# Patient Record
Sex: MALE | Race: WHITE | NOT HISPANIC OR LATINO | URBAN - METROPOLITAN AREA
[De-identification: names, ages, dates, MRNs, and addresses within clinical notes are randomized per-mention and may not be internally consistent; named-entity substitution may affect disease eponyms.]

---

## 2019-12-27 PROBLEM — Z00.00 ENCOUNTER FOR PREVENTIVE HEALTH EXAMINATION: Status: ACTIVE | Noted: 2019-12-27

## 2020-02-06 ENCOUNTER — EMERGENCY (EMERGENCY)
Facility: HOSPITAL | Age: 60
LOS: 0 days | Discharge: HOME | End: 2020-02-07
Attending: EMERGENCY MEDICINE | Admitting: EMERGENCY MEDICINE
Payer: COMMERCIAL

## 2020-02-06 VITALS
SYSTOLIC BLOOD PRESSURE: 152 MMHG | OXYGEN SATURATION: 99 % | WEIGHT: 210.1 LBS | TEMPERATURE: 98 F | DIASTOLIC BLOOD PRESSURE: 86 MMHG | HEIGHT: 70 IN | HEART RATE: 110 BPM | RESPIRATION RATE: 18 BRPM

## 2020-02-06 DIAGNOSIS — R07.9 CHEST PAIN, UNSPECIFIED: ICD-10-CM

## 2020-02-06 DIAGNOSIS — H53.149 VISUAL DISCOMFORT, UNSPECIFIED: ICD-10-CM

## 2020-02-06 DIAGNOSIS — R11.0 NAUSEA: ICD-10-CM

## 2020-02-06 DIAGNOSIS — D68.0 VON WILLEBRAND DISEASE: ICD-10-CM

## 2020-02-06 DIAGNOSIS — K21.9 GASTRO-ESOPHAGEAL REFLUX DISEASE WITHOUT ESOPHAGITIS: ICD-10-CM

## 2020-02-06 DIAGNOSIS — R42 DIZZINESS AND GIDDINESS: ICD-10-CM

## 2020-02-06 DIAGNOSIS — E11.9 TYPE 2 DIABETES MELLITUS WITHOUT COMPLICATIONS: ICD-10-CM

## 2020-02-06 LAB
ALBUMIN SERPL ELPH-MCNC: 4.4 G/DL — SIGNIFICANT CHANGE UP (ref 3.5–5.2)
ALP SERPL-CCNC: 70 U/L — SIGNIFICANT CHANGE UP (ref 30–115)
ALT FLD-CCNC: 27 U/L — SIGNIFICANT CHANGE UP (ref 0–41)
ANION GAP SERPL CALC-SCNC: 15 MMOL/L — HIGH (ref 7–14)
AST SERPL-CCNC: 17 U/L — SIGNIFICANT CHANGE UP (ref 0–41)
BASOPHILS # BLD AUTO: 0.04 K/UL — SIGNIFICANT CHANGE UP (ref 0–0.2)
BASOPHILS NFR BLD AUTO: 0.4 % — SIGNIFICANT CHANGE UP (ref 0–1)
BILIRUB SERPL-MCNC: 0.3 MG/DL — SIGNIFICANT CHANGE UP (ref 0.2–1.2)
BUN SERPL-MCNC: 21 MG/DL — HIGH (ref 10–20)
CALCIUM SERPL-MCNC: 9.4 MG/DL — SIGNIFICANT CHANGE UP (ref 8.5–10.1)
CHLORIDE SERPL-SCNC: 105 MMOL/L — SIGNIFICANT CHANGE UP (ref 98–110)
CHOLEST SERPL-MCNC: 194 MG/DL — SIGNIFICANT CHANGE UP (ref 100–200)
CO2 SERPL-SCNC: 23 MMOL/L — SIGNIFICANT CHANGE UP (ref 17–32)
CREAT SERPL-MCNC: 0.9 MG/DL — SIGNIFICANT CHANGE UP (ref 0.7–1.5)
D DIMER BLD IA.RAPID-MCNC: 54 NG/ML DDU — SIGNIFICANT CHANGE UP (ref 0–230)
EOSINOPHIL # BLD AUTO: 0.08 K/UL — SIGNIFICANT CHANGE UP (ref 0–0.7)
EOSINOPHIL NFR BLD AUTO: 0.8 % — SIGNIFICANT CHANGE UP (ref 0–8)
GLUCOSE SERPL-MCNC: 159 MG/DL — HIGH (ref 70–99)
HCT VFR BLD CALC: 41.9 % — LOW (ref 42–52)
HDLC SERPL-MCNC: 62 MG/DL — SIGNIFICANT CHANGE UP
HGB BLD-MCNC: 14.5 G/DL — SIGNIFICANT CHANGE UP (ref 14–18)
IMM GRANULOCYTES NFR BLD AUTO: 0.6 % — HIGH (ref 0.1–0.3)
LIPID PNL WITH DIRECT LDL SERPL: 119 MG/DL — SIGNIFICANT CHANGE UP (ref 4–129)
LYMPHOCYTES # BLD AUTO: 1.95 K/UL — SIGNIFICANT CHANGE UP (ref 1.2–3.4)
LYMPHOCYTES # BLD AUTO: 18.4 % — LOW (ref 20.5–51.1)
MAGNESIUM SERPL-MCNC: 2 MG/DL — SIGNIFICANT CHANGE UP (ref 1.8–2.4)
MCHC RBC-ENTMCNC: 29.7 PG — SIGNIFICANT CHANGE UP (ref 27–31)
MCHC RBC-ENTMCNC: 34.6 G/DL — SIGNIFICANT CHANGE UP (ref 32–37)
MCV RBC AUTO: 85.7 FL — SIGNIFICANT CHANGE UP (ref 80–94)
MONOCYTES # BLD AUTO: 0.8 K/UL — HIGH (ref 0.1–0.6)
MONOCYTES NFR BLD AUTO: 7.6 % — SIGNIFICANT CHANGE UP (ref 1.7–9.3)
NEUTROPHILS # BLD AUTO: 7.64 K/UL — HIGH (ref 1.4–6.5)
NEUTROPHILS NFR BLD AUTO: 72.2 % — SIGNIFICANT CHANGE UP (ref 42.2–75.2)
NRBC # BLD: 0 /100 WBCS — SIGNIFICANT CHANGE UP (ref 0–0)
NT-PROBNP SERPL-SCNC: 21 PG/ML — SIGNIFICANT CHANGE UP (ref 0–300)
PLATELET # BLD AUTO: 183 K/UL — SIGNIFICANT CHANGE UP (ref 130–400)
POTASSIUM SERPL-MCNC: 4 MMOL/L — SIGNIFICANT CHANGE UP (ref 3.5–5)
POTASSIUM SERPL-SCNC: 4 MMOL/L — SIGNIFICANT CHANGE UP (ref 3.5–5)
PROT SERPL-MCNC: 6.9 G/DL — SIGNIFICANT CHANGE UP (ref 6–8)
RBC # BLD: 4.89 M/UL — SIGNIFICANT CHANGE UP (ref 4.7–6.1)
RBC # FLD: 13.1 % — SIGNIFICANT CHANGE UP (ref 11.5–14.5)
SODIUM SERPL-SCNC: 143 MMOL/L — SIGNIFICANT CHANGE UP (ref 135–146)
TOTAL CHOLESTEROL/HDL RATIO MEASUREMENT: 3.1 RATIO — LOW (ref 4–5.5)
TRIGL SERPL-MCNC: 104 MG/DL — SIGNIFICANT CHANGE UP (ref 10–149)
TROPONIN T SERPL-MCNC: <0.01 NG/ML — SIGNIFICANT CHANGE UP
TROPONIN T SERPL-MCNC: <0.01 NG/ML — SIGNIFICANT CHANGE UP
WBC # BLD: 10.57 K/UL — SIGNIFICANT CHANGE UP (ref 4.8–10.8)
WBC # FLD AUTO: 10.57 K/UL — SIGNIFICANT CHANGE UP (ref 4.8–10.8)

## 2020-02-06 PROCEDURE — 70450 CT HEAD/BRAIN W/O DYE: CPT | Mod: 26

## 2020-02-06 PROCEDURE — 99253 IP/OBS CNSLTJ NEW/EST LOW 45: CPT

## 2020-02-06 PROCEDURE — 93016 CV STRESS TEST SUPVJ ONLY: CPT

## 2020-02-06 PROCEDURE — 99220: CPT

## 2020-02-06 PROCEDURE — 93010 ELECTROCARDIOGRAM REPORT: CPT | Mod: 76

## 2020-02-06 PROCEDURE — 71045 X-RAY EXAM CHEST 1 VIEW: CPT | Mod: 26

## 2020-02-06 PROCEDURE — 93018 CV STRESS TEST I&R ONLY: CPT

## 2020-02-06 PROCEDURE — 78452 HT MUSCLE IMAGE SPECT MULT: CPT | Mod: 26

## 2020-02-06 PROCEDURE — 70551 MRI BRAIN STEM W/O DYE: CPT | Mod: 26

## 2020-02-06 RX ORDER — ADENOSINE 3 MG/ML
60 INJECTION INTRAVENOUS ONCE
Refills: 0 | Status: COMPLETED | OUTPATIENT
Start: 2020-02-06 | End: 2020-02-06

## 2020-02-06 RX ORDER — MECLIZINE HCL 12.5 MG
25 TABLET ORAL ONCE
Refills: 0 | Status: COMPLETED | OUTPATIENT
Start: 2020-02-06 | End: 2020-02-06

## 2020-02-06 RX ORDER — ATORVASTATIN CALCIUM 80 MG/1
80 TABLET, FILM COATED ORAL AT BEDTIME
Refills: 0 | Status: DISCONTINUED | OUTPATIENT
Start: 2020-02-06 | End: 2020-02-07

## 2020-02-06 RX ORDER — SODIUM CHLORIDE 9 MG/ML
1000 INJECTION INTRAMUSCULAR; INTRAVENOUS; SUBCUTANEOUS ONCE
Refills: 0 | Status: COMPLETED | OUTPATIENT
Start: 2020-02-06 | End: 2020-02-06

## 2020-02-06 RX ORDER — ONDANSETRON 8 MG/1
4 TABLET, FILM COATED ORAL ONCE
Refills: 0 | Status: COMPLETED | OUTPATIENT
Start: 2020-02-06 | End: 2020-02-06

## 2020-02-06 RX ORDER — ACETAMINOPHEN 500 MG
650 TABLET ORAL ONCE
Refills: 0 | Status: COMPLETED | OUTPATIENT
Start: 2020-02-06 | End: 2020-02-06

## 2020-02-06 RX ADMIN — Medication 650 MILLIGRAM(S): at 03:12

## 2020-02-06 RX ADMIN — Medication 25 MILLIGRAM(S): at 04:34

## 2020-02-06 RX ADMIN — Medication 650 MILLIGRAM(S): at 07:09

## 2020-02-06 RX ADMIN — SODIUM CHLORIDE 1000 MILLILITER(S): 9 INJECTION INTRAMUSCULAR; INTRAVENOUS; SUBCUTANEOUS at 03:15

## 2020-02-06 RX ADMIN — ONDANSETRON 4 MILLIGRAM(S): 8 TABLET, FILM COATED ORAL at 03:12

## 2020-02-06 RX ADMIN — Medication 1 MILLIGRAM(S): at 17:45

## 2020-02-06 RX ADMIN — ADENOSINE 600 MILLIGRAM(S): 3 INJECTION INTRAVENOUS at 11:21

## 2020-02-06 RX ADMIN — SODIUM CHLORIDE 2000 MILLILITER(S): 9 INJECTION INTRAMUSCULAR; INTRAVENOUS; SUBCUTANEOUS at 03:12

## 2020-02-06 RX ADMIN — ATORVASTATIN CALCIUM 80 MILLIGRAM(S): 80 TABLET, FILM COATED ORAL at 21:15

## 2020-02-06 NOTE — ED PROVIDER NOTE - PROGRESS NOTE DETAILS
Spoke with Neuro NP. Stated neuro will see the patient. Neuro NP stated that symptoms are classic vertigo. Stated that neuro attending will also see patient for final recommendations. Signed out patient to observation unit PA.

## 2020-02-06 NOTE — ED PROVIDER NOTE - ATTENDING CONTRIBUTION TO CARE
60y m h/o vWF, abnormal clotting factor (unsure of name / not on asa), dm, hl, gerd p/w dizziness x 3d. Described as intermitt spinning sensation, no specific aggrav/allev factors, accomp by ha, photophobia & nausea. Similar sx in past never formally dx as vertigo. Called PMD Dr. Lu yesterday who gave Rx for meclizine, pt took few doses w/some relief but sx recurred this evening. Tonight also dvlpd L upper chest pain, palpitations & sob. Denies blurry vision, neck pain or stiffness, cough, hemoptysis, v/d, abd pain, focal numbness or weakness. No recent travel/sx/immob/hormone use. No FHx HD. Normal nuc stress in Apr 2012, seen by Cardio Dr. Singer @ time.     PE:  nad  skin warm, dry  ncat  neck supple  rrr nl s1s2 no mrg  ctab no wrr  abd soft ntnd no palpable masses no rgr  back non-tender no cvat  ext no cce dpi  neuro aaox3, fatiguable bl horizontal nystagmus, otherwise cn 2-12 intact bl no nystagmus or facial droop, 5/5 strength x 4 no drift, gross sensation intact, finger-nose nl, gait nl, romberg neg

## 2020-02-06 NOTE — ED CDU PROVIDER INITIAL DAY NOTE - OBJECTIVE STATEMENT
The patient is a 60 year old male with a history of von Willebrand disease, DM, GERD comes to the ed c/o for dizziness and chest pain. Symptoms initially began with nausea x3 days ago and then the pt woke up with severe dizziness and felt like the room Is spinning. pt also c/o photophobia. pt went to see his PMD who gave him meclizine which helped at the time but the patient began having dizziness symptoms again with new onset shortness of breath and chest pain that is pressure. pt state his CP is located in the middle of the chest and associated with left arm "pulsating." No headaches, urinary sx, abdominal pain, fever, chills, vomiting, weakness, leg swelling.

## 2020-02-06 NOTE — CONSULT NOTE ADULT - SUBJECTIVE AND OBJECTIVE BOX
Neurology Consult    Patient is a 60y old  Male who presents with a chief complaint of vertigo with chest pain and palpatations    HPI:· The patient is a 60 year old male with a history of von Willebrand disease, DM, GERD presenting for dizziness and chest pain. Symptoms began three days ago with nausea. The following day patient woke up with severe dizziness and room-spinning sensation. Associated with photophobia. Called his PMD who told him to come to the office but patient did not feel well enough so PMD prescribed meclizine. Patient states meclizine improved symptoms. This evening, patient began having dizziness symptoms again with new onset shortness of breath and chest pain that is pressure like, located in the middle of the chest associated with left arm "pulsating." No fevers, no recent travel, no hormone use, no hemoptysis, no recent surgeries, no on any anticoagulation.        PAST MEDICAL & SURGICAL HISTORY:      FAMILY HISTORY:      Social History: (-) x 3    Allergies    No Known Allergies    Intolerances        MEDICATIONS  (STANDING):    MEDICATIONS  (PRN):      Review of systems:    Constitutional: No fever, weight loss or fatigue    Eyes: No eye pain or discharge  ENMT:  No difficulty hearing; No sinus or throat pain  Neck: No pain or stiffness  Respiratory: No cough, wheezing, chills or hemoptysis  Cardiovascular: No chest pain, palpitations, shortness of breath, dyspnea on exertion  Gastrointestinal: No abdominal pain, nausea, vomiting or hematemesis; No diarrhea or constipation.   Genitourinary: No dysuria, frequency, hematuria or incontinence  Neurological: As per HPI  Skin: No rashes or lesions   Endocrine: No heat or cold intolerance; No hair loss  Musculoskeletal: No joint pain or swelling  Psychiatric: No depression, anxiety, mood swings  Heme/Lymph: No easy bruising or bleeding gums    Vital Signs Last 24 Hrs  T(C): 36.7 (06 Feb 2020 01:21), Max: 36.7 (06 Feb 2020 01:21)  T(F): 98.1 (06 Feb 2020 01:21), Max: 98.1 (06 Feb 2020 01:21)  HR: 110 (06 Feb 2020 01:21) (110 - 110)  BP: 152/86 (06 Feb 2020 01:21) (152/86 - 152/86)  BP(mean): --  RR: 18 (06 Feb 2020 01:21) (18 - 18)  SpO2: 99% (06 Feb 2020 01:21) (99% - 99%)    Neurologic Examination:  General:  Appearance is consistent with chronologic age.  No abnormal facies.   General: The patient is oriented to person, place, time and date.  Recent and remote memory intact.  Fund of knowledge is intact and normal.  Language with normal repetition, comprehension and naming.  Nondysarthric.    Cranial nerves: intact VA, VFF.  EOMI w/o nystagmus, skew or reported double vision.  PERRL.  No ptosis/weakness of eyelid closure.  Facial sensation is normal with normal bite.  No facial asymmetry.  Hearing grossly intact b/l.  Palate elevates midline.  Tongue midline.  Motor examination:   Normal tone, bulk and range of motion.  No tenderness, twitching, tremors or involuntary movements.  Formal Muscle Strength Testing: (MRC grade R/L) 5/5 UE; 5/5 LE.  No observable drift.  Reflexes:   2+ b/l pectoralis, biceps, triceps, brachioradialis, patella and Achilles.  Plantar response downgoing b/l.  Jaw jerk, Gurvinder, clonus absent.  Sensory examination:   Intact to light touch and pinprick, pain, temperature and proprioception and vibration in all extremities.  Cerebellum:   FTN/HKS intact with normal DUANE in all limbs.  No dysmetria or dysdiadokinesia.  Gait narrow based and normal.    Labs:   CBC Full  -  ( 06 Feb 2020 03:08 )  WBC Count : 10.57 K/uL  RBC Count : 4.89 M/uL  Hemoglobin : 14.5 g/dL  Hematocrit : 41.9 %  Platelet Count - Automated : 183 K/uL  Mean Cell Volume : 85.7 fL  Mean Cell Hemoglobin : 29.7 pg  Mean Cell Hemoglobin Concentration : 34.6 g/dL  Auto Neutrophil # : 7.64 K/uL  Auto Lymphocyte # : 1.95 K/uL  Auto Monocyte # : 0.80 K/uL  Auto Eosinophil # : 0.08 K/uL  Auto Basophil # : 0.04 K/uL  Auto Neutrophil % : 72.2 %  Auto Lymphocyte % : 18.4 %  Auto Monocyte % : 7.6 %  Auto Eosinophil % : 0.8 %  Auto Basophil % : 0.4 %    02-06    143  |  105  |  21<H>  ----------------------------<  159<H>  4.0   |  23  |  0.9    Ca    9.4      06 Feb 2020 03:08  Mg     2.0     02-06    TPro  6.9  /  Alb  4.4  /  TBili  0.3  /  DBili  x   /  AST  17  /  ALT  27  /  AlkPhos  70  02-06    LIVER FUNCTIONS - ( 06 Feb 2020 03:08 )  Alb: 4.4 g/dL / Pro: 6.9 g/dL / ALK PHOS: 70 U/L / ALT: 27 U/L / AST: 17 U/L / GGT: x                   Neuroimaging:  NCHCT: CT Head No Cont:   EXAM:  CT BRAIN            PROCEDURE DATE:  02/06/2020            INTERPRETATION:  Clinical History: Dizziness.    Technique: Multiple contiguous axial CT images of the head were obtained  from the base of the skull to the vertex without administration of intravenous contrast. Coronal and sagittal reformats were obtained.    Comparison: None available.    Findings:     The ventricles, basal cisterns and sulcal pattern are within normal limits for the patient's stated age.      Grey-white differentiation is preserved.    There is no acute mass effect, midline shift or intracranial hemorrhage.      The calvarium is intact.    The visualized paranasal sinuses and bilateral mastoid complexes are unremarkable.     Impression:     No CT evidence of acute intracranial pathology.              FRANCESCA ERWIN M.D., RESIDENT RADIOLOGIST  This document has been electronically signed.  SHAKA PRYOR M.D., ATTENDING RADIOLOGIST  This document has been electronically signed. Feb 6 2020  3:58AM            (02-06-20 @ 03:31)    CT Angiography/Perfusion:  MRI Brain NC:  MRA Head/Neck:  EEG:    -   02-06-20 @ 04:59 Neurology Consult    Patient is a 60y old  Male who presents with a chief complaint of vertigo with chest pain and palpatations    HPI:· The patient is a 60 year old male with a history of von Willebrand disease, DM, GERD presenting for dizziness and chest pain. Symptoms began three days ago with nausea. The following day patient woke up with severe dizziness and room-spinning sensation. Associated with photophobia. Called his PMD who told him to come to the office but patient did not feel well enough so PMD prescribed meclizine. Patient states meclizine improved symptoms. This evening, patient began having dizziness symptoms again with new onset shortness of breath and chest pain that is pressure like, located in the middle of the chest associated with left arm "pulsating." No fevers, no recent travel, no hormone use, no hemoptysis, no recent surgeries, no on any anticoagulation.      Interval Hx; Upon presentation pt c/o vertigo,with nausea/vomiting that presents when sitting upright and is relieved with recombinant position: in conjuction with HA, photophobia, and neck pain. Had symptoms since tuesday and took meclizine for 1 day felt better but didn't not continue to take it. Pt became progressively symptomatic today with  left side Chest pain and tightness with associated palpatations.. Pt has since received IV fluids with meclizine. Pt now able to ambulate in the [address] and vertigo has resolved- no longer requiring to be laying down to be symptom free.  However, Pt still c/o chest tightness and chest pain on the left side along c/o SOB and difficulties taking deep breaths..        PAST MEDICAL & SURGICAL HISTORY:      FAMILY HISTORY:      Social History: (-) x 3    Allergies    No Known Allergies    Intolerances        MEDICATIONS  (STANDING):    MEDICATIONS  (PRN):      Review of systems:    Constitutional: No fever, weight loss or fatigue    Eyes: No eye pain or discharge  ENMT:  No difficulty hearing; No sinus or throat pain  Neck: No pain or stiffness  Respiratory: No cough, wheezing, chills or hemoptysis  Cardiovascular: No chest pain, palpitations, shortness of breath, dyspnea on exertion  Gastrointestinal: No abdominal pain, nausea, vomiting or hematemesis; No diarrhea or constipation.   Genitourinary: No dysuria, frequency, hematuria or incontinence  Neurological: As per HPI  Skin: No rashes or lesions   Endocrine: No heat or cold intolerance; No hair loss  Musculoskeletal: No joint pain or swelling  Psychiatric: No depression, anxiety, mood swings  Heme/Lymph: No easy bruising or bleeding gums    Vital Signs Last 24 Hrs  T(C): 36.7 (06 Feb 2020 01:21), Max: 36.7 (06 Feb 2020 01:21)  T(F): 98.1 (06 Feb 2020 01:21), Max: 98.1 (06 Feb 2020 01:21)  HR: 110 (06 Feb 2020 01:21) (110 - 110)  BP: 152/86 (06 Feb 2020 01:21) (152/86 - 152/86)  BP(mean): --  RR: 18 (06 Feb 2020 01:21) (18 - 18)  SpO2: 99% (06 Feb 2020 01:21) (99% - 99%)    Neurologic Examination:  General:  Appearance is consistent with chronologic age.  No abnormal facies.   General: The patient is oriented to person, place, time and date.  Recent and remote memory intact.  Fund of knowledge is intact and normal.  Language with normal repetition, comprehension and naming.  Nondysarthric.    Cranial nerves: intact VA, VFF.  EOMI w/o nystagmus, skew or reported double vision.  PERRL.  No ptosis/weakness of eyelid closure.  Facial sensation is normal with normal bite.  No facial asymmetry.  Hearing grossly intact b/l.  Palate elevates midline.  Tongue midline.  Motor examination:   Normal tone, bulk and range of motion.  No tenderness, twitching, tremors or involuntary movements.  Formal Muscle Strength Testing: (MRC grade R/L) 5/5 UE; 5/5 LE.  No observable drift.  Reflexes:   2+ b/l pectoralis, biceps, triceps, brachioradialis, patella and Achilles.  Plantar response downgoing b/l.  Jaw jerk, Gurvinder, clonus absent.  Sensory examination:   Intact to light touch and pinprick, pain, temperature and proprioception and vibration in all extremities.  Cerebellum:   FTN/HKS intact with normal DUANE in all limbs.  No dysmetria or dysdiadokinesia.  Gait narrow based and normal.    Labs:   CBC Full  -  ( 06 Feb 2020 03:08 )  WBC Count : 10.57 K/uL  RBC Count : 4.89 M/uL  Hemoglobin : 14.5 g/dL  Hematocrit : 41.9 %  Platelet Count - Automated : 183 K/uL  Mean Cell Volume : 85.7 fL  Mean Cell Hemoglobin : 29.7 pg  Mean Cell Hemoglobin Concentration : 34.6 g/dL  Auto Neutrophil # : 7.64 K/uL  Auto Lymphocyte # : 1.95 K/uL  Auto Monocyte # : 0.80 K/uL  Auto Eosinophil # : 0.08 K/uL  Auto Basophil # : 0.04 K/uL  Auto Neutrophil % : 72.2 %  Auto Lymphocyte % : 18.4 %  Auto Monocyte % : 7.6 %  Auto Eosinophil % : 0.8 %  Auto Basophil % : 0.4 %    02-06    143  |  105  |  21<H>  ----------------------------<  159<H>  4.0   |  23  |  0.9    Ca    9.4      06 Feb 2020 03:08  Mg     2.0     02-06    TPro  6.9  /  Alb  4.4  /  TBili  0.3  /  DBili  x   /  AST  17  /  ALT  27  /  AlkPhos  70  02-06    LIVER FUNCTIONS - ( 06 Feb 2020 03:08 )  Alb: 4.4 g/dL / Pro: 6.9 g/dL / ALK PHOS: 70 U/L / ALT: 27 U/L / AST: 17 U/L / GGT: x                   Neuroimaging:  NCHCT: CT Head No Cont:   EXAM:  CT BRAIN            PROCEDURE DATE:  02/06/2020            INTERPRETATION:  Clinical History: Dizziness.    Technique: Multiple contiguous axial CT images of the head were obtained  from the base of the skull to the vertex without administration of intravenous contrast. Coronal and sagittal reformats were obtained.    Comparison: None available.    Findings:     The ventricles, basal cisterns and sulcal pattern are within normal limits for the patient's stated age.      Grey-white differentiation is preserved.    There is no acute mass effect, midline shift or intracranial hemorrhage.      The calvarium is intact.    The visualized paranasal sinuses and bilateral mastoid complexes are unremarkable.     Impression:     No CT evidence of acute intracranial pathology.              FRANCESCA ERWIN M.D., RESIDENT RADIOLOGIST  This document has been electronically signed.  SHAKA PRYOR M.D., ATTENDING RADIOLOGIST  This document has been electronically signed. Feb 6 2020  3:58AM            (02-06-20 @ 03:31)

## 2020-02-06 NOTE — ED ADULT NURSE NOTE - OBJECTIVE STATEMENT
Pt presented to ED d/t [facility]. As per pt, pt started having c/o n/v x couple of days. Pt was prescribed meclizine. took 2 doses, w/ + relief. Pt today, c/o dizziness, n/v. Denies fevers/chills, denies trauma. Pt A&Ox4, ambulatory. Pt sensitive to light.

## 2020-02-06 NOTE — ED CDU PROVIDER INITIAL DAY NOTE - PROGRESS NOTE DETAILS
pt seen bedside, resting comfortably went for NUC with normal adenosine stress test, pt also scheduled for MRI will pend results for MRI and new labs ordered. will continue to monitor. received signout from Bib [place] - pt in obs for cp/veritgo; mri done results pending; pt to have echo in am; pm meds ordered - will continue to follow;

## 2020-02-06 NOTE — ED PROVIDER NOTE - PHYSICAL EXAMINATION
CONSTITUTIONAL: Well-developed; well-nourished; nontoxic appearing.  SKIN: warm, dry  HEAD: Normocephalic; atraumatic.  EYES: PERRL, EOMI, normal sclera and conjunctiva   ENT: No nasal discharge; airway clear. TMs clear bilaterally, nonbulging.   NECK: Supple; non tender.  CARD: S1, S2 normal; no murmurs, gallops, or rubs. Regular rate and rhythm.   RESP: No wheezes, rales or rhonchi.  ABD: soft ntnd  EXT: Normal ROM.  No clubbing, cyanosis or edema.   LYMPH: No acute cervical adenopathy.  NEURO: Alert, oriented, grossly unremarkable. CN 2-12 intact. 5/5 sensation in upper and lower extremities.   PSYCH: Cooperative, appropriate. appears anxious.

## 2020-02-06 NOTE — CONSULT NOTE ADULT - ASSESSMENT
Assessment:  This is a 60y Male with h/o HTN, DM,GERD p/w vertigo x 2 days with new onset chest pain and palpatations    Plan: Assessment:  This is a 60y Male with h/o HTN, DM, GERD p/w vertigo x 2 days with new onset chest pain and palpatations; vertigo symptoms resolved with meclizine and IV fluids..With NIH 0    Plan: Appears to be classic vertigo -has it occurs with upright postion and symptoms resolved with meclizine       Continue Meclizine PRN       TIA work up started - Admit to Obs for MRI of brain, TSH, lipid panel, and electrolyte panel, echo with bubble study        Continue cardiac work up EKG,Troponin, CKMB, etc..

## 2020-02-06 NOTE — CONSULT NOTE ADULT - ATTENDING COMMENTS
Patient seen and examined and agree with above except as noted.  Patients history, imaging, labs, vitals and notes were reviewed by me personally.  Patient had vertigo for last 3 days and came in because of left chest pain.  Had similar vertigo about 20 years ago and mother has history of menieres disease and father has parkinsons disease.  Patient also recently fell and dislocated left thumb and wife says occasionally will get tremors in the hands.  Vertigo is better this morning.    NIHSS 0  mrankin 0    Plan as above  Counseled about possible menieres disease and peripheral vertigo

## 2020-02-06 NOTE — ED ADULT TRIAGE NOTE - CHIEF COMPLAINT QUOTE
pt started vomiting tuesday aw dizziness, was prescribed meclizine. took 2 doses, felt relief. woke up tonight, co dizziness and shoulder pain, light sensitivity and sob and chest tightness.

## 2020-02-06 NOTE — ED PROVIDER NOTE - NS ED ROS FT
Eyes:  +photophobia. no eye discharge.   ENMT:  No hearing changes, pain, discharge or infections. No neck pain or stiffness.  Cardiac:  +chest pain. + shortness of breath. no edema. No chest pain with exertion.  Respiratory:  No cough or respiratory distress. No hemoptysis. No history of asthma or RAD.  GI:  +nausea, vomiting, no diarrhea or abdominal pain.  :  No dysuria, frequency or burning.  MS:  No myalgia, muscle weakness, or back pain.  Neuro:  +headache, no weakness.  No LOC.  Skin:  No skin rash.   Endocrine: No history of thyroid disease. +diabetes.

## 2020-02-06 NOTE — ED PROVIDER NOTE - OBJECTIVE STATEMENT
The patient is a 60 year old male with a history of von Willebrand disease, DM, GERD presenting for dizziness and chest pain. Symptoms began three days ago with nausea. The following day patient woke up with severe dizziness and room-spinning sensation. Associated with photophobia. Called his PMD who told him to come to the office but patient did not feel well enough so PMD prescribed meclizine. Patient states meclizine improved symptoms. This evening, patient began having dizziness symptoms again with new onset shortness of breath and chest pain that is pressure like, located in the middle of the chest associated with left arm "pulsating." No fevers, no recent travel, no hormone use, no hemoptysis, no recent surgeries, no on any anticoagulation.

## 2020-02-06 NOTE — ED ADULT NURSE NOTE - NSIMPLEMENTINTERV_GEN_ALL_ED
Implemented All Universal Safety Interventions:  [place] to call system. Call bell, personal items and telephone within reach. Instruct patient to call for assistance. Room bathroom lighting operational. Non-slip footwear when patient is off stretcher. Physically safe environment: no spills, clutter or unnecessary equipment. Stretcher in lowest position, wheels locked, appropriate side rails in place.

## 2020-02-06 NOTE — ED PROVIDER NOTE - CLINICAL SUMMARY MEDICAL DECISION MAKING FREE TEXT BOX
vertigo, CP - ED w/u incl ekg, cxr, labs incl d-dimer, CTH unremarkable - pt still sx, Neuro consulted and rec EDOU placement for vertigo, CP - ED w/u incl ekg, cxr, labs incl d-dimer, CTH unremarkable - pt still sx, Neuro consulted and rec EDOU placement for further TIA & cardiac work-up

## 2020-02-07 VITALS
OXYGEN SATURATION: 96 % | TEMPERATURE: 98 F | HEART RATE: 89 BPM | RESPIRATION RATE: 18 BRPM | DIASTOLIC BLOOD PRESSURE: 85 MMHG | SYSTOLIC BLOOD PRESSURE: 138 MMHG

## 2020-02-07 PROCEDURE — 99217: CPT

## 2020-02-07 PROCEDURE — 93306 TTE W/DOPPLER COMPLETE: CPT | Mod: 26

## 2020-02-07 NOTE — ED CDU PROVIDER SUBSEQUENT DAY NOTE - ATTENDING CONTRIBUTION TO CARE
61yo man h/o von willebrand factor deficiency, DM was placed in CDU for workup of vertigo for the last couple of days followed by an episode of chest pain. ED workup was unremarkable, pt was monitored in CDU without incident, serial EKG/trops unchanged. Nuclear stress test was negative, plan is for MRI. VS, exam as noted, pt feeling better on my eval with normal exam.

## 2020-02-07 NOTE — ED CDU PROVIDER SUBSEQUENT DAY NOTE - MEDICAL DECISION MAKING DETAILS
59yo man h/o von willebrand factor deficiency, DM was placed in CDU for workup of vertigo for the last couple of days followed by an episode of chest pain. ED workup was unremarkable, pt was monitored in CDU without incident, serial EKG/trops unchanged. Nuclear stress test was negative, plan is for MRI. VS, exam as noted, pt feeling better on my eval with normal exam.

## 2020-02-07 NOTE — ED CDU PROVIDER DISPOSITION NOTE - NSFOLLOWUPINSTRUCTIONS_ED_ALL_ED_FT
Follow up with your PMD - Dr Lu in 1 week  Follow up with Neurology in 1 week  Follow up with Cardiology in 1-2 weeks  Take Meclizine as needed for dizziness  Continue all your home medications  Return to the ED if your symptoms worsen/return

## 2020-02-07 NOTE — ED CDU PROVIDER DISPOSITION NOTE - PATIENT PORTAL LINK FT
You can access the FollowMyHealth Patient Portal offered by [facility] by registering at the following website: [email or web address]. By joining [employer]’s FollowMyHealth portal, you will also be able to view your health information using other applications (apps) compatible with our system.

## 2020-02-07 NOTE — ED CDU PROVIDER SUBSEQUENT DAY NOTE - HISTORY
pt resting in obs without complaints; stress test negative; pt pending MRI head results and pt scheduled for echo in am;

## 2020-02-07 NOTE — ED CDU PROVIDER DISPOSITION NOTE - ATTENDING CONTRIBUTION TO CARE
61yo man h/o von willebrand factor deficiency, DM was placed in CDU for workup of vertigo for the last couple of days followed by an episode of chest pain. ED workup was unremarkable, pt was monitored in CDU without incident, serial EKG/trops unchanged. Nuclear stress test was negative, MRI negative as well. Pt d/c to f/u neuro, ENT.

## 2020-02-07 NOTE — ED CDU PROVIDER DISPOSITION NOTE - NSFOLLOWUPCLINICS_GEN_ALL_ED_FT
[facility]  Neurology  [address] [place] [address], [facility] [address]  [place] [address]  Phone: [phone]  Fax:   Follow Up Time:

## 2020-02-07 NOTE — ED ADULT NURSE REASSESSMENT NOTE - NS ED NURSE REASSESS COMMENT FT1
PT ASSESSED. PT A&OX4. CARDIAC MONITORING MAINTAINED. PT WALKS WITH WALKER. DENIES PAIN. IV PATENT. SAFETY AND COMFORT MAINTAINED. WILL CONT TO MONITOR.
Pt laying comfortably in bed. Alert and oriented x 4, able to make needs known. Pt. denies any complaints of chest pain, dizziness or headache. Trops negative x 2. Scheduled for ECHO to be done. Continues on cardiac monitoring. Will continue to monitor.
pt assessed, VSS, pending result of 2nd troponin. denies c/o pain at this time. no signs of distress. cont cardiac monitoring maintained.
pt assessed, denies c/o chest pain or dizziness. pt states, "I feel much better." pending 2DECHO. cont cardiac monitoring maintained. no signs of distress, IVs patent. will cont to assess and monitor.

## 2020-02-07 NOTE — ED CDU PROVIDER DISPOSITION NOTE - CARE PROVIDER_API CALL
Shanell Lu (DO)  Internal Medicine  [facility] [place] [address]  Phone: [phone]  Fax: [phone]  Follow Up Time: 7-10 Days    Armando Jung)  Cardiovascular Disease; Internal Medicine  [address] [place] [address]  Phone: [phone]  Fax: [phone]  Follow Up Time:

## 2020-02-08 LAB — TSH SERPL-MCNC: 0.99 UIU/ML — SIGNIFICANT CHANGE UP (ref 0.27–4.2)

## 2021-11-15 NOTE — ED CDU PROVIDER INITIAL DAY NOTE - CPE EDP RESP NORM
Post-Anesthesia Evaluation and Assessment    Patient: Jamal Alamo MRN: [id number]  SSN: [id number]    YOB: 1977  Age: 38 y.o. Sex: female      I have evaluated the patient and they are stable and ready for discharge from the PACU. Cardiovascular Function/Vital Signs  Visit Vitals  /77   Pulse 61   Temp 36.4 °C (97.5 °F)   Resp 16   Ht 5' 3.5\" (1.613 m)   Wt 81.6 kg (180 lb)   SpO2 98%   BMI 31.39 kg/m²       Patient is status post MAC anesthesia for Procedure(s): HYSTEROSCOPY, D& C,WITH NOVASURE ABLATION. Nausea/Vomiting: None    Postoperative hydration reviewed and adequate. Pain:  Pain Scale 1: Numeric (0 - 10) (11/15/21 0845)  Pain Intensity 1: 0 (11/15/21 0845)   Managed    Neurological Status:   Neuro (WDL): Exceptions to WDL (11/15/21 0845)  Neuro  Neurologic State: Drowsy; Eyes open spontaneously (11/15/21 0845)  Orientation Level: Oriented X4 (11/15/21 0845)   At baseline    Mental Status, Level of Consciousness: Alert and  oriented to person, place, and time    Pulmonary Status:   O2 Device: None (Room air) (11/15/21 0845)   Adequate oxygenation and airway patent    Complications related to anesthesia: None    Post-anesthesia assessment completed. No concerns    Signed By: Mathieu Damian MD     November 15, 2021              Procedure(s): HYSTEROSCOPY, D& C,WITH NOVASURE ABLATION. MAC, general - backup    <[email or web address]>    INITIAL Post-op Vital signs:   Vitals Value Taken Time   /77 11/15/21 0900   Temp 36.4 °C (97.5 °F) 11/15/21 0845   Pulse 56 11/15/21 0913   Resp 14 11/15/21 0913   SpO2 100 % 11/15/21 0913   Vitals shown include unvalidated device data. normal...

## 2022-08-18 ENCOUNTER — NON-APPOINTMENT (OUTPATIENT)
Age: 62
End: 2022-08-18

## 2023-11-18 ENCOUNTER — EMERGENCY (EMERGENCY)
Facility: HOSPITAL | Age: 63
LOS: 0 days | Discharge: HOME | End: 2023-11-18
Attending: STUDENT IN AN ORGANIZED HEALTH CARE EDUCATION/TRAINING PROGRAM
Payer: COMMERCIAL

## 2023-11-18 VITALS
OXYGEN SATURATION: 99 % | SYSTOLIC BLOOD PRESSURE: 123 MMHG | TEMPERATURE: 98 F | DIASTOLIC BLOOD PRESSURE: 86 MMHG | WEIGHT: 192.02 LBS | HEART RATE: 81 BPM | RESPIRATION RATE: 18 BRPM

## 2023-11-18 VITALS
SYSTOLIC BLOOD PRESSURE: 117 MMHG | HEART RATE: 69 BPM | DIASTOLIC BLOOD PRESSURE: 76 MMHG | RESPIRATION RATE: 16 BRPM | OXYGEN SATURATION: 99 %

## 2023-11-18 DIAGNOSIS — S61.210A LACERATION WITHOUT FOREIGN BODY OF RIGHT INDEX FINGER WITHOUT DAMAGE TO NAIL, INITIAL ENCOUNTER: ICD-10-CM

## 2023-11-18 DIAGNOSIS — E11.9 TYPE 2 DIABETES MELLITUS WITHOUT COMPLICATIONS: ICD-10-CM

## 2023-11-18 DIAGNOSIS — S62.630B DISPLACED FRACTURE OF DISTAL PHALANX OF RIGHT INDEX FINGER, INITIAL ENCOUNTER FOR OPEN FRACTURE: ICD-10-CM

## 2023-11-18 DIAGNOSIS — Z23 ENCOUNTER FOR IMMUNIZATION: ICD-10-CM

## 2023-11-18 DIAGNOSIS — D68.00 VON WILLEBRAND DISEASE, UNSPECIFIED: ICD-10-CM
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
## 2023-11-18 LAB
ALBUMIN SERPL ELPH-MCNC: 4.4 G/DL — SIGNIFICANT CHANGE UP (ref 3.5–5.2)
ALBUMIN SERPL ELPH-MCNC: 4.4 G/DL — SIGNIFICANT CHANGE UP (ref 3.5–5.2)
ALP SERPL-CCNC: 83 U/L — SIGNIFICANT CHANGE UP (ref 30–115)
ALP SERPL-CCNC: 83 U/L — SIGNIFICANT CHANGE UP (ref 30–115)
ALT FLD-CCNC: 24 U/L — SIGNIFICANT CHANGE UP (ref 0–41)
ALT FLD-CCNC: 24 U/L — SIGNIFICANT CHANGE UP (ref 0–41)
ANION GAP SERPL CALC-SCNC: 13 MMOL/L — SIGNIFICANT CHANGE UP (ref 7–14)
ANION GAP SERPL CALC-SCNC: 13 MMOL/L — SIGNIFICANT CHANGE UP (ref 7–14)
AST SERPL-CCNC: 19 U/L — SIGNIFICANT CHANGE UP (ref 0–41)
AST SERPL-CCNC: 19 U/L — SIGNIFICANT CHANGE UP (ref 0–41)
BASOPHILS # BLD AUTO: 0.06 K/UL — SIGNIFICANT CHANGE UP (ref 0–0.2)
BASOPHILS # BLD AUTO: 0.06 K/UL — SIGNIFICANT CHANGE UP (ref 0–0.2)
BASOPHILS NFR BLD AUTO: 0.9 % — SIGNIFICANT CHANGE UP (ref 0–1)
BASOPHILS NFR BLD AUTO: 0.9 % — SIGNIFICANT CHANGE UP (ref 0–1)
BILIRUB SERPL-MCNC: 0.4 MG/DL — SIGNIFICANT CHANGE UP (ref 0.2–1.2)
BILIRUB SERPL-MCNC: 0.4 MG/DL — SIGNIFICANT CHANGE UP (ref 0.2–1.2)
BUN SERPL-MCNC: 17 MG/DL — SIGNIFICANT CHANGE UP (ref 10–20)
BUN SERPL-MCNC: 17 MG/DL — SIGNIFICANT CHANGE UP (ref 10–20)
CALCIUM SERPL-MCNC: 9.6 MG/DL — SIGNIFICANT CHANGE UP (ref 8.4–10.5)
CALCIUM SERPL-MCNC: 9.6 MG/DL — SIGNIFICANT CHANGE UP (ref 8.4–10.5)
CHLORIDE SERPL-SCNC: 105 MMOL/L — SIGNIFICANT CHANGE UP (ref 98–110)
CHLORIDE SERPL-SCNC: 105 MMOL/L — SIGNIFICANT CHANGE UP (ref 98–110)
CO2 SERPL-SCNC: 26 MMOL/L — SIGNIFICANT CHANGE UP (ref 17–32)
CO2 SERPL-SCNC: 26 MMOL/L — SIGNIFICANT CHANGE UP (ref 17–32)
CREAT SERPL-MCNC: 0.9 MG/DL — SIGNIFICANT CHANGE UP (ref 0.7–1.5)
CREAT SERPL-MCNC: 0.9 MG/DL — SIGNIFICANT CHANGE UP (ref 0.7–1.5)
EGFR: 97 ML/MIN/1.73M2 — SIGNIFICANT CHANGE UP
EGFR: 97 ML/MIN/1.73M2 — SIGNIFICANT CHANGE UP
EOSINOPHIL # BLD AUTO: 0.06 K/UL — SIGNIFICANT CHANGE UP (ref 0–0.7)
EOSINOPHIL # BLD AUTO: 0.06 K/UL — SIGNIFICANT CHANGE UP (ref 0–0.7)
EOSINOPHIL NFR BLD AUTO: 0.9 % — SIGNIFICANT CHANGE UP (ref 0–8)
EOSINOPHIL NFR BLD AUTO: 0.9 % — SIGNIFICANT CHANGE UP (ref 0–8)
GLUCOSE SERPL-MCNC: 159 MG/DL — HIGH (ref 70–99)
GLUCOSE SERPL-MCNC: 159 MG/DL — HIGH (ref 70–99)
HCT VFR BLD CALC: 43.5 % — SIGNIFICANT CHANGE UP (ref 42–52)
HCT VFR BLD CALC: 43.5 % — SIGNIFICANT CHANGE UP (ref 42–52)
HGB BLD-MCNC: 14.7 G/DL — SIGNIFICANT CHANGE UP (ref 14–18)
HGB BLD-MCNC: 14.7 G/DL — SIGNIFICANT CHANGE UP (ref 14–18)
IMM GRANULOCYTES NFR BLD AUTO: 0.3 % — SIGNIFICANT CHANGE UP (ref 0.1–0.3)
IMM GRANULOCYTES NFR BLD AUTO: 0.3 % — SIGNIFICANT CHANGE UP (ref 0.1–0.3)
LYMPHOCYTES # BLD AUTO: 1.59 K/UL — SIGNIFICANT CHANGE UP (ref 1.2–3.4)
LYMPHOCYTES # BLD AUTO: 1.59 K/UL — SIGNIFICANT CHANGE UP (ref 1.2–3.4)
LYMPHOCYTES # BLD AUTO: 24.8 % — SIGNIFICANT CHANGE UP (ref 20.5–51.1)
LYMPHOCYTES # BLD AUTO: 24.8 % — SIGNIFICANT CHANGE UP (ref 20.5–51.1)
MCHC RBC-ENTMCNC: 29.6 PG — SIGNIFICANT CHANGE UP (ref 27–31)
MCHC RBC-ENTMCNC: 29.6 PG — SIGNIFICANT CHANGE UP (ref 27–31)
MCHC RBC-ENTMCNC: 33.8 G/DL — SIGNIFICANT CHANGE UP (ref 32–37)
MCHC RBC-ENTMCNC: 33.8 G/DL — SIGNIFICANT CHANGE UP (ref 32–37)
MCV RBC AUTO: 87.5 FL — SIGNIFICANT CHANGE UP (ref 80–94)
MCV RBC AUTO: 87.5 FL — SIGNIFICANT CHANGE UP (ref 80–94)
MONOCYTES # BLD AUTO: 0.4 K/UL — SIGNIFICANT CHANGE UP (ref 0.1–0.6)
MONOCYTES # BLD AUTO: 0.4 K/UL — SIGNIFICANT CHANGE UP (ref 0.1–0.6)
MONOCYTES NFR BLD AUTO: 6.3 % — SIGNIFICANT CHANGE UP (ref 1.7–9.3)
MONOCYTES NFR BLD AUTO: 6.3 % — SIGNIFICANT CHANGE UP (ref 1.7–9.3)
NEUTROPHILS # BLD AUTO: 4.27 K/UL — SIGNIFICANT CHANGE UP (ref 1.4–6.5)
NEUTROPHILS # BLD AUTO: 4.27 K/UL — SIGNIFICANT CHANGE UP (ref 1.4–6.5)
NEUTROPHILS NFR BLD AUTO: 66.8 % — SIGNIFICANT CHANGE UP (ref 42.2–75.2)
NEUTROPHILS NFR BLD AUTO: 66.8 % — SIGNIFICANT CHANGE UP (ref 42.2–75.2)
NRBC # BLD: 0 /100 WBCS — SIGNIFICANT CHANGE UP (ref 0–0)
NRBC # BLD: 0 /100 WBCS — SIGNIFICANT CHANGE UP (ref 0–0)
PLATELET # BLD AUTO: 192 K/UL — SIGNIFICANT CHANGE UP (ref 130–400)
PLATELET # BLD AUTO: 192 K/UL — SIGNIFICANT CHANGE UP (ref 130–400)
PMV BLD: 10.3 FL — SIGNIFICANT CHANGE UP (ref 7.4–10.4)
PMV BLD: 10.3 FL — SIGNIFICANT CHANGE UP (ref 7.4–10.4)
POTASSIUM SERPL-MCNC: 4.6 MMOL/L — SIGNIFICANT CHANGE UP (ref 3.5–5)
POTASSIUM SERPL-MCNC: 4.6 MMOL/L — SIGNIFICANT CHANGE UP (ref 3.5–5)
POTASSIUM SERPL-SCNC: 4.6 MMOL/L — SIGNIFICANT CHANGE UP (ref 3.5–5)
POTASSIUM SERPL-SCNC: 4.6 MMOL/L — SIGNIFICANT CHANGE UP (ref 3.5–5)
PROT SERPL-MCNC: 7.4 G/DL — SIGNIFICANT CHANGE UP (ref 6–8)
PROT SERPL-MCNC: 7.4 G/DL — SIGNIFICANT CHANGE UP (ref 6–8)
RBC # BLD: 4.97 M/UL — SIGNIFICANT CHANGE UP (ref 4.7–6.1)
RBC # BLD: 4.97 M/UL — SIGNIFICANT CHANGE UP (ref 4.7–6.1)
RBC # FLD: 13.2 % — SIGNIFICANT CHANGE UP (ref 11.5–14.5)
RBC # FLD: 13.2 % — SIGNIFICANT CHANGE UP (ref 11.5–14.5)
SODIUM SERPL-SCNC: 144 MMOL/L — SIGNIFICANT CHANGE UP (ref 135–146)
SODIUM SERPL-SCNC: 144 MMOL/L — SIGNIFICANT CHANGE UP (ref 135–146)
WBC # BLD: 6.4 K/UL — SIGNIFICANT CHANGE UP (ref 4.8–10.8)
WBC # BLD: 6.4 K/UL — SIGNIFICANT CHANGE UP (ref 4.8–10.8)
WBC # FLD AUTO: 6.4 K/UL — SIGNIFICANT CHANGE UP (ref 4.8–10.8)
WBC # FLD AUTO: 6.4 K/UL — SIGNIFICANT CHANGE UP (ref 4.8–10.8)

## 2023-11-18 PROCEDURE — 96374 THER/PROPH/DIAG INJ IV PUSH: CPT | Mod: XU

## 2023-11-18 PROCEDURE — 85025 COMPLETE CBC W/AUTO DIFF WBC: CPT

## 2023-11-18 PROCEDURE — 99285 EMERGENCY DEPT VISIT HI MDM: CPT

## 2023-11-18 PROCEDURE — 99284 EMERGENCY DEPT VISIT MOD MDM: CPT | Mod: 25

## 2023-11-18 PROCEDURE — 90471 IMMUNIZATION ADMIN: CPT

## 2023-11-18 PROCEDURE — 73140 X-RAY EXAM OF FINGER(S): CPT | Mod: RT

## 2023-11-18 PROCEDURE — 80053 COMPREHEN METABOLIC PANEL: CPT

## 2023-11-18 PROCEDURE — 73140 X-RAY EXAM OF FINGER(S): CPT | Mod: 26,RT

## 2023-11-18 PROCEDURE — 36415 COLL VENOUS BLD VENIPUNCTURE: CPT

## 2023-11-18 PROCEDURE — 82962 GLUCOSE BLOOD TEST: CPT

## 2023-11-18 PROCEDURE — 12001 RPR S/N/AX/GEN/TRNK 2.5CM/<: CPT

## 2023-11-18 PROCEDURE — 90715 TDAP VACCINE 7 YRS/> IM: CPT

## 2023-11-18 RX ORDER — TETANUS TOXOID, REDUCED DIPHTHERIA TOXOID AND ACELLULAR PERTUSSIS VACCINE, ADSORBED 5; 2.5; 8; 8; 2.5 [IU]/.5ML; [IU]/.5ML; UG/.5ML; UG/.5ML; UG/.5ML
0.5 SUSPENSION INTRAMUSCULAR ONCE
Refills: 0 | Status: COMPLETED | OUTPATIENT
Start: 2023-11-18 | End: 2023-11-18

## 2023-11-18 RX ORDER — CEFAZOLIN SODIUM 1 G
2000 VIAL (EA) INJECTION ONCE
Refills: 0 | Status: COMPLETED | OUTPATIENT
Start: 2023-11-18 | End: 2023-11-18

## 2023-11-18 RX ADMIN — Medication 100 MILLIGRAM(S): at 14:07

## 2023-11-18 RX ADMIN — TETANUS TOXOID, REDUCED DIPHTHERIA TOXOID AND ACELLULAR PERTUSSIS VACCINE, ADSORBED 0.5 MILLILITER(S): 5; 2.5; 8; 8; 2.5 SUSPENSION INTRAMUSCULAR at 14:07

## 2023-11-18 NOTE — ED PROVIDER NOTE - CLINICAL SUMMARY MEDICAL DECISION MAKING FREE TEXT BOX
63-year-old male with history of diabetes, von Willebrand syndrome not on any medications, unknown last tetanus presenting today after patient sustained complex laceration of the right index finger with a saw approximately 1.5 hours prior to arrival.  xray confirming open fracture, given IV abx. tetanus updated. wound repaired by surgery. labs stable. abx sent to pharmacy. follow up with Dr. Combs. return precautions discussed.

## 2023-11-18 NOTE — ED PROVIDER NOTE - TOBACCO USE
[FreeTextEntry3] : Patient being seen as per physician's primary plan of care.\par  [Time Spent: ___ minutes] : I have spent [unfilled] minutes of time on the encounter. Never smoker

## 2023-11-18 NOTE — ED ADULT NURSE NOTE - OBJECTIVE STATEMENT
patient c/o laceration to right 1st finger with circular saw. unknown tetanus patient c/o laceration to right 2nd finger with circular saw. unknown tetanus

## 2023-11-18 NOTE — ED PROVIDER NOTE - PATIENT PORTAL LINK FT
You can access the FollowMyHealth Patient Portal offered by [facility] by registering at the following website: [email or web address]. By joining [facility]’s FollowMyHealth portal, you will also be able to view your health information using other applications (apps) compatible with our system.

## 2023-11-18 NOTE — ED PROVIDER NOTE - CARE PROVIDER_API CALL
Charles Combs.  Plastic Surgery  [address] [place] [address], [address]  [place] [address]  Phone: [phone]  Fax: [phone]  Scheduled Appointment: 11/20/2023

## 2023-11-18 NOTE — CONSULT NOTE ADULT - SUBJECTIVE AND OBJECTIVE BOX
Patient is a 62 y/o with PMHx of DM, Von willebrand who presents to [facility] s/p laceration to right index finger ( Patient is Left Hand dominant). Patient was cutting wood for his cabinets and the power saw cut the tip of his right index finger pulling most of the nail off. Patient denies loss of sensation, decreased ROM, or additional injuries.       PAST MEDICAL & SURGICAL HISTORY:  DM      Allergies  No Known Allergies    Review of Systems  General:  Denies Fatigue, Denies Fever, Denies Weakness ,Denies Weight Loss   HEENT: Denies Trouble Swallowing ,Denies  Sore Throat , Denies Change in hearing/vision/speech ,Denies Dizziness    Cardio: Denies  Chest Pain , Palpitations    Respiratory: Denies worsening of SOB, Denies Cough  Abdomen: Denies abdominal pain, nausea, or emesis   Neuro: Denies Headache Denies Dizziness, Denies Paresthesias  MSK: See HPI  Psych: Patient denies depression, denies suicidal or homicidal ideations  Integ: Patient Denies rash, or new skin lesions     Vital Signs Last 24 Hrs  T(C): 36.7 (18 Nov 2023 13:42), Max: 36.7 (18 Nov 2023 13:42)  T(F): 98 (18 Nov 2023 13:42), Max: 98 (18 Nov 2023 13:42)  HR: 69 (18 Nov 2023 17:17) (69 - 81)  BP: 117/76 (18 Nov 2023 17:17) (117/76 - 123/86)  BP(mean): --  RR: 16 (18 Nov 2023 17:17) (16 - 18)  SpO2: 99% (18 Nov 2023 17:17) (99% - 99%)    Parameters below as of 18 Nov 2023 17:17  Patient On (Oxygen Delivery Method): room air    Physical Exam  Gen: NAD  Head: NC/AT, no visible deformity  ENT: PERRLA,  Cardio: S1/S2   Resp: CTA B/L  Abdomen: Soft, ND/NT  Neuro: AAOx3  Extremities: FROM x 4, Right hand- laceration of Index finger , with removal of two thirds of nail, not present, - Full ROM extension and flexion of digit, full sensation, excellent capillary refill         Labs:  POCT Blood Glucose.: 172 mg/dL (18 Nov 2023 13:55)                          14.7   6.40  )-----------( 192      ( 18 Nov 2023 14:08 )             43.5       Auto Neutrophil %: 66.8 % (11-18-23 @ 14:08)  Auto Immature Granulocyte %: 0.3 % (11-18-23 @ 14:08)    11-18    144  |  105  |  17  ----------------------------<  159<H>  4.6   |  26  |  0.9      Calcium: 9.6 mg/dL (11-18-23 @ 14:08)      LFTs:             7.4  | 0.4  | 19       ------------------[83      ( 18 Nov 2023 14:08 )  4.4  | x    | 24          RADIOLOGY & ADDITIONAL STUDIES:

## 2023-11-18 NOTE — ED PROVIDER NOTE - OBJECTIVE STATEMENT
63-year-old male history of diabetes, von Willebrand's syndrome not on medications presents for right index finger laceration 1.5 hours before arrival with a saw.  Patient was cutting wood.  No other injuries, not on any blood thinners.  Bleeding controlled prior to arrival.

## 2023-11-18 NOTE — ED PROVIDER NOTE - ATTENDING CONTRIBUTION TO CARE
63-year-old male with history of diabetes, von Willebrand syndrome not on any medications, unknown last tetanus presenting today after patient sustained complex laceration of the right index finger with a saw approximately 1.5 hours prior to arrival.  On exam patient is well-appearing, noted to have laceration through the entire half of nail of the right index finger, patient able to flex and extend DIP, sensation intact.  Bleeding controlled.  Per triage nurse, bone was visible in triage.  At this time will defer evaluation for bony involvement to prevent significant bleeding.  Will treat empirically for open fracture. tdap update. xray and hand surgery.

## 2023-11-18 NOTE — CONSULT NOTE ADULT - ASSESSMENT
Patient is a 62 y/o with PMHx of DM, Von willebrand who presents to [facility] s/p laceration to right index finger ( Patient is Left Hand dominant). Patient was cutting wood for his cabinets and the power saw cut the tip of his right index finger pulling most of the nail off. Patient denies loss of sensation, decreased ROM, or additional injuries. Patient reassuringly had full ROM and sesnation in the right ingured digits along with excellant capillary refill. Patient had 2/3 of nail missing. Digit prepped block performed and closure achieved. Splint placed after in a slight degree of Flexon.    Laceration of right index finger tip  - Exam reassuring, Full ROM and sensation in digit, excellent capillary refill  - Area cleaned and prepped , digital block performed, closure achieved adn splint placed  - Patient to follow up with Dr. Combs this Monday- Follow up stressed  - Tetanus given in ED  - Discharge on Augmentin   - Advised to return to ED if bleeding starts, loss of sensation in digit, fever, or if pain worsens  Patient is a 64 y/o with PMHx of DM, Von willebrand who presents to [facility] s/p laceration to right index finger ( Patient is Left Hand dominant). Patient was cutting wood for his cabinets and the power saw cut the tip of his right index finger pulling most of the nail off. Patient denies loss of sensation, decreased ROM, or additional injuries. Patient reassuringly had full ROM and sesnation in the right ingured digits along with excellant capillary refill. Patient had 2/3 of nail missing. Digit prepped block performed and closure achieved. Splint placed after in a slight degree of Flexon.    Laceration of right index finger tip  - Exam reassuring, Full ROM and sensation in digit, excellent capillary refill  - Area cleaned and prepped , digital block performed, closure achieved ang splint placed  - Patient to follow up with Dr. Cobms this Monday  - Pain control  - Tetanus given in ED  - Discharge on Augmentin   - Advised to return to ED if bleeding starts, loss of sensation in digit, fever, or if pain worsens

## 2023-11-18 NOTE — ED PROVIDER NOTE - PHYSICAL EXAMINATION
Vital Signs: I have reviewed the initial vital signs.  Constitutional: well-nourished, appears stated age, no acute distress.  HEENT: Airway patent, moist MM, no erythema/swelling/deformity of oral structures. EOMI, PERRLA.  CV: regular rate, regular rhythm, well-perfused extremities, 2+ bilateral DP and radial pulses equal.  Lungs: Clear to ascultation bilaterally, no crackles, no wheezes, no increased work of breathing.  ABD: Non-tender, Non-distended, no guarding or rebound, no pulsatile mass, no hernias, no flank pain.   MSK: Neck supple, nontender, normal range of motion, 1.5cm complex lac to R index finger. able to flex and extend at DIP and PIP, sensation intact.   INTE.5cm lac as above  NEURO: A&Ox3, moving all extremities, normal speech

## 2023-11-20 ENCOUNTER — APPOINTMENT (OUTPATIENT)
Dept: PLASTIC SURGERY | Facility: CLINIC | Age: 63
End: 2023-11-20
Payer: COMMERCIAL

## 2023-11-20 VITALS — HEIGHT: 70 IN | BODY MASS INDEX: 27.49 KG/M2 | WEIGHT: 192 LBS

## 2023-11-20 DIAGNOSIS — Z86.39 PERSONAL HISTORY OF OTHER ENDOCRINE, NUTRITIONAL AND METABOLIC DISEASE: ICD-10-CM

## 2023-11-20 DIAGNOSIS — Z78.9 OTHER SPECIFIED HEALTH STATUS: ICD-10-CM

## 2023-11-20 DIAGNOSIS — Z86.2 PERSONAL HISTORY OF DISEASES OF THE BLOOD AND BLOOD-FORMING ORGANS AND CERTAIN DISORDERS INVOLVING THE IMMUNE MECHANISM: ICD-10-CM

## 2023-11-20 DIAGNOSIS — Z87.19 PERSONAL HISTORY OF OTHER DISEASES OF THE DIGESTIVE SYSTEM: ICD-10-CM

## 2023-11-20 PROCEDURE — 99203 OFFICE O/P NEW LOW 30 MIN: CPT

## 2023-11-20 RX ORDER — DULAGLUTIDE 4.5 MG/.5ML
INJECTION, SOLUTION SUBCUTANEOUS
Refills: 0 | Status: ACTIVE | COMMUNITY

## 2023-11-20 RX ORDER — FAMOTIDINE 10 MG/1
TABLET, FILM COATED ORAL
Refills: 0 | Status: ACTIVE | COMMUNITY

## 2023-11-20 RX ORDER — CHOLECALCIFEROL (VITAMIN D3) 1250 MCG
1.25 MG CAPSULE ORAL
Refills: 0 | Status: ACTIVE | COMMUNITY

## 2023-11-20 RX ORDER — ESOMEPRAZOLE MAGNESIUM 20 MG/1
TABLET, DELAYED RELEASE ORAL
Refills: 0 | Status: ACTIVE | COMMUNITY

## 2023-11-20 RX ORDER — ROSUVASTATIN CALCIUM 5 MG/1
TABLET, FILM COATED ORAL
Refills: 0 | Status: ACTIVE | COMMUNITY

## 2023-11-29 ENCOUNTER — APPOINTMENT (OUTPATIENT)
Dept: PLASTIC SURGERY | Facility: CLINIC | Age: 63
End: 2023-11-29
Payer: COMMERCIAL

## 2023-11-29 DIAGNOSIS — S62.630A DISPLACED FRACTURE OF DISTAL PHALANX OF RIGHT INDEX FINGER, INITIAL ENCOUNTER FOR CLOSED FRACTURE: ICD-10-CM

## 2023-11-29 DIAGNOSIS — S61.310S: ICD-10-CM

## 2023-11-29 PROCEDURE — 99213 OFFICE O/P EST LOW 20 MIN: CPT

## 2023-12-21 ENCOUNTER — APPOINTMENT (OUTPATIENT)
Dept: PLASTIC SURGERY | Facility: CLINIC | Age: 63
End: 2023-12-21
Payer: COMMERCIAL

## 2023-12-21 PROCEDURE — 99212 OFFICE O/P EST SF 10 MIN: CPT

## 2023-12-21 NOTE — HISTORY OF PRESENT ILLNESS
[FreeTextEntry1] :  64 y/o left hand dominant male with PMHx of DM, HLD, GERD, Von willebrand who presented to [facility] on 11/18/23 s/p laceration to right index finger. Patient was cutting wood for his cabinets and the power saw cut the tip of his right index finger pulling 2/3 nail off.  Per hand x-ray, pt with a 2nd proximal phalanx distal tuft comminuted fx. Wound irrigated, lac repaired and splint placed in a slight degree of flexion however pt removed splint himself yesterday as he c/o bandage becoming saturated with bright red blood. Pt received a teatnus shot in the ED and continues on Augmentin daily. Denies fever, chills or current active drainage. Has been elevating his hand as much as possible.   Non smoker  Interval hx (11/29/23). Pt here for f/u and wound check. Doing well with no significant pain, f/c or drainage. Completed all prescribed antibiotics.

## 2023-12-21 NOTE — DATA REVIEWED
[FreeTextEntry1] :  ACC: [id number] EXAM: XR FINGER(S) MIN 2 VIEWS RT ORDERED BY: TOMÁS JONES  PROCEDURE DATE: 11/18/2023    INTERPRETATION: Left second digit, 3 views  INDICATION: Index finger injury.  COMPARISON: None.  FINDINGS/ IMPRESSION: Comminuted fracture through the tuft of the second distal phalanx with surrounding soft tissue swelling. No other acute findings.  --- End of Report ---      GHAZALA JACKSON MD; Attending Radiologist This document has been electronically signed. Nov 19 2023 8:29AM

## 2023-12-21 NOTE — PHYSICAL EXAM
[de-identified] : Well developed, well nourished in NAD  [de-identified] : nonlabored breathing  [de-identified] : Right hand: warm and well perfused, index finger distal tip laceration through nail plate with skin maceration, partially avulsed nail, no erythema, swelling as expected with delayedw ound healing, Limited ROM at DIPJ 2/2 pain and swelling. No evidence of tendon injury. No exposed bone.

## 2023-12-21 NOTE — ASSESSMENT
[FreeTextEntry1] : 64 y/o left hand dominant male with distal tuft comminuted fx of right index finger after power saw injury on 11/18/23. Doing well.   - Dressing changed with Xeroform/Bacitracin/anu - Splint reapplied, new one provided  - Wound care every other day with Xeroform/Bacitracin and finger splint, supplies given  - Hand rest and elevation - s/s of infection reviewed and all his questions were answered - f/u 2-3 weeks with Dr. Combs.   12/21/2023 cap splint f/u 2 months doing well may get wet stop [email or web address]

## 2024-02-21 ENCOUNTER — APPOINTMENT (OUTPATIENT)
Dept: PLASTIC SURGERY | Facility: CLINIC | Age: 64
End: 2024-02-21
